# Patient Record
(demographics unavailable — no encounter records)

---

## 2024-11-16 NOTE — HISTORY OF PRESENT ILLNESS
[N] : Patient does not use contraception [Y] : Positive pregnancy history [Menarche Age: ____] : age at menarche was [unfilled] [No] : Patient does not have concerns regarding sex [Currently Active] : currently active [Men] : men [Mammogramdate] : 11/16/23 [BreastSonogramDate] : 11/09/22 [TextBox_19] : BR2 [TextBox_25] : BR2 [PapSmeardate] : 11/13/23 [TextBox_31] : NEG [BoneDensityDate] : 11/16/23 [TextBox_37] : OSTEOPOROTIC [TextBox_43] : HEMORRHOIDS [ColonoscopyDate] : 2020 [HPVDate] : 11/13/23 [TextBox_78] : NEG [LMPDate] : 11/09/19 [PGHxTotal] : 3 [[facility] Center[facility]] : 3 [[facility]Term] : 3 [FreeTextEntry1] : 11/09/19

## 2024-11-16 NOTE — PHYSICAL EXAM
[Chaperone Present] : A chaperone was present in the examining room during all aspects of the physical examination [Appropriately responsive] : appropriately responsive [Alert] : alert [No Acute Distress] : no acute distress [No Lymphadenopathy] : no lymphadenopathy [Soft] : soft [Non-distended] : non-distended [Non-tender] : non-tender [No Lesions] : no lesions [No HSM] : No HSM [No Mass] : no mass [Oriented x3] : oriented x3 [Examination Of The Breasts] : a normal appearance [No Masses] : no breast masses were palpable [Labia Majora] : normal [Labia Minora] : normal [Rectocele] : a rectocele [Normal] : normal [Uterine Adnexae] : normal [External Hemorrhoid] : external hemorrhoid

## 2024-11-16 NOTE — PLAN
[FreeTextEntry1] : Discussed the rectocele- pelvic floor relaxation-avoid constipation urogyn eval for pressure discussed TVUS and results landy FU prn FU GYN annually or sooner prn FU mammo/sono FU dexa 11/23

## 2024-11-16 NOTE — PHYSICAL EXAM
[Chaperone Present] : A chaperone was present in the examining room during all aspects of the physical examination [Appropriately responsive] : appropriately responsive [Alert] : alert [No Acute Distress] : no acute distress [No Lymphadenopathy] : no lymphadenopathy [Soft] : soft [Non-distended] : non-distended [Non-tender] : non-tender [No HSM] : No HSM [No Lesions] : no lesions [No Mass] : no mass [Oriented x3] : oriented x3 [Examination Of The Breasts] : a normal appearance [No Masses] : no breast masses were palpable [Labia Majora] : normal [Labia Minora] : normal [Rectocele] : a rectocele [Normal] : normal [Uterine Adnexae] : normal [External Hemorrhoid] : external hemorrhoid

## 2024-11-16 NOTE — HISTORY OF PRESENT ILLNESS
[N] : Patient does not use contraception [Y] : Positive pregnancy history [Menarche Age: ____] : age at menarche was [unfilled] [No] : Patient does not have concerns regarding sex [Currently Active] : currently active [Men] : men [Mammogramdate] : 11/16/23 [BreastSonogramDate] : 11/09/22 [TextBox_19] : BR2 [TextBox_25] : BR2 [PapSmeardate] : 11/13/23 [TextBox_31] : NEG [BoneDensityDate] : 11/16/23 [TextBox_37] : OSTEOPOROTIC [TextBox_43] : HEMORRHOIDS [ColonoscopyDate] : 2020 [HPVDate] : 11/13/23 [TextBox_78] : NEG [LMPDate] : 11/09/19 [PGHxTotal] : 3 [[facility] HospitallTerm] : 3 [[facility] Centeriving] : 3 [FreeTextEntry1] : 11/09/19

## 2025-01-06 NOTE — HISTORY OF PRESENT ILLNESS
[postmenopausal] : postmenopausal [N] : Patient does not use contraception [Y] : Positive pregnancy history [Menarche Age: ____] : age at menarche was [unfilled] [No] : Patient does not have concerns regarding sex [Mammogramdate] : 11/20/24 [TextBox_19] : BR2 [BreastSonogramDate] : 11/20/24 [TextBox_25] : BR2 [PapSmeardate] : 11/16/24 [TextBox_31] : UNSATISFACTORY [BoneDensityDate] : 11/16/23 [TextBox_37] : OSTEOPOROTIC [HPVDate] : 11/16/24 [TextBox_78] : NEG [LMPDate] : 11/09/19 [PGHxTotal] : 3 [[facility] HospitallTerm] : 3 [[facility]iving] : 3 [FreeTextEntry1] : 11/09/19

## 2025-01-12 NOTE — HISTORY OF PRESENT ILLNESS
[FreeTextEntry1] : HPI    Patient presents for US for pelvic pressure and chronic urgency and repeat pap denies any leakage of urine   LMP: 19   Last pap: 2024 unsatisfactory, HPV neg     --------------------------------------------------------------------------------------------------------- ASSESSMENT & PLAN:    60 y/o   25 US: uterus 6 cm anteverted. EMs 2.3 mm. No FF.  Normal adnexa.  Fibroid 1.17 subserosal fibroid  #unsatisfactory pap: -repeat pap, HPV  #subserosal fibroid -recommend repeat US in 4-6 months for stability; order placed  #pelvic pressure due to grade 2 rectocele -exam findings reviewed -refer to uro/gyn    rto 4-6 month f/u TV US and fibroid    Dr. Nicol Ball, DO, MPH, FACOG

## 2025-01-12 NOTE — HISTORY OF PRESENT ILLNESS
[FreeTextEntry1] : HPI    Patient presents for US for pelvic pressure and chronic urgency and repeat pap denies any leakage of urine   LMP: 19   Last pap: 2024 unsatisfactory, HPV neg     --------------------------------------------------------------------------------------------------------- ASSESSMENT & PLAN:    58 y/o   25 US: uterus 6 cm anteverted. EMs 2.3 mm. No FF.  Normal adnexa.  Fibroid 1.17 subserosal fibroid  #unsatisfactory pap: -repeat pap, HPV  #subserosal fibroid -recommend repeat US in 4-6 months for stability; order placed  #pelvic pressure due to grade 2 rectocele -exam findings reviewed -refer to uro/gyn    rto 4-6 month f/u TV US and fibroid    Dr. Nicol Ball, DO, MPH, FACOG

## 2025-01-12 NOTE — PHYSICAL EXAM
[Chaperone Present] : A chaperone was present in the examining room during all aspects of the physical examination [[id number]] : A chaperone was present during the pelvic exam. [FreeTextEntry2] : SHAI [Appropriately responsive] : appropriately responsive [Alert] : alert [No Acute Distress] : no acute distress [Soft] : soft [Non-tender] : non-tender [Non-distended] : non-distended [Oriented x3] : oriented x3 [Labia Majora] : normal [Labia Minora] : normal [No Bleeding] : There was no active vaginal bleeding [Normal] : normal [Uterine Adnexae] : normal [FreeTextEntry4] : grade 2 rectocele

## 2025-01-12 NOTE — PHYSICAL EXAM
[Chaperone Present] : A chaperone was present in the examining room during all aspects of the physical examination [[id number]] : A chaperone was present during the pelvic exam. [FreeTextEntry2] : SHAI [Appropriately responsive] : appropriately responsive [Alert] : alert [No Acute Distress] : no acute distress [Soft] : soft [Non-tender] : non-tender [Non-distended] : non-distended [Oriented x3] : oriented x3 [Labia Majora] : normal [Labia Minora] : normal [No Bleeding] : There was no active vaginal bleeding [Normal] : normal [Uterine Adnexae] : normal [FreeTextEntry4] : grade 2 rectocele

## 2025-01-12 NOTE — PHYSICAL EXAM
[Chaperone Present] : A chaperone was present in the examining room during all aspects of the physical examination [[id number]] : A chaperone was present during the pelvic exam. [FreeTextEntry2] : SHAI [Appropriately responsive] : appropriately responsive [Alert] : alert [No Acute Distress] : no acute distress [Soft] : soft [Non-tender] : non-tender [Non-distended] : non-distended [Oriented x3] : oriented x3 [Labia Majora] : normal [Labia Minora] : normal [No Bleeding] : There was no active vaginal bleeding [Normal] : normal [Uterine Adnexae] : normal [FreeTextEntry4] : grade 2 rectocele

## 2025-05-05 NOTE — HISTORY OF PRESENT ILLNESS
[FreeTextEntry1] : HPI    patient presents for US to f/u small fibroid  no complaints  LMP: 2019    Last pap: 2025 neg cytology, neg hpv Last [id number] neg       --------------------------------------------------------------------------------------------------------- ASSESSMENT & PLAN:  25 US: uterus 5 cm. EMS 3.0. RO normal. LO not seen  fibroid 1.18 cm subserosal fibroid     25 US: uterus 6 cm anteverted. EMs 2.3 mm. No FF. Normal adnexa. Fibroid 1.17 subserosal fibroid  #fibroids -stable    rto gyn annual 2026   Dr. Nicol Ball DO, MPH, FACOG